# Patient Record
Sex: FEMALE | Race: WHITE | Employment: OTHER | URBAN - METROPOLITAN AREA
[De-identification: names, ages, dates, MRNs, and addresses within clinical notes are randomized per-mention and may not be internally consistent; named-entity substitution may affect disease eponyms.]

---

## 2023-10-12 ENCOUNTER — TELEPHONE (OUTPATIENT)
Dept: PRIMARY CARE | Facility: CLINIC | Age: 73
End: 2023-10-12
Payer: MEDICARE

## 2023-10-12 DIAGNOSIS — E78.5 HYPERLIPIDEMIA, UNSPECIFIED HYPERLIPIDEMIA TYPE: ICD-10-CM

## 2023-10-12 PROBLEM — M25.562 LEFT KNEE PAIN: Status: ACTIVE | Noted: 2023-10-12

## 2023-10-12 PROBLEM — I44.7 LEFT BUNDLE BRANCH BLOCK (LBBB): Status: ACTIVE | Noted: 2023-10-12

## 2023-10-12 PROBLEM — E78.00 ELEVATED LDL CHOLESTEROL LEVEL: Status: ACTIVE | Noted: 2023-10-12

## 2023-10-12 PROBLEM — H40.9 GLAUCOMA: Status: ACTIVE | Noted: 2023-10-12

## 2023-10-12 PROBLEM — J02.9 SORE THROAT: Status: ACTIVE | Noted: 2023-10-12

## 2023-10-12 PROBLEM — R79.89 ELEVATED TSH: Status: ACTIVE | Noted: 2023-10-12

## 2023-10-12 PROBLEM — R31.9 HEMATURIA: Status: ACTIVE | Noted: 2023-10-12

## 2023-10-12 PROBLEM — J20.9 ACUTE BRONCHITIS: Status: ACTIVE | Noted: 2023-10-12

## 2023-10-12 PROBLEM — H61.21 IMPACTED CERUMEN OF RIGHT EAR: Status: ACTIVE | Noted: 2023-10-12

## 2023-12-19 ENCOUNTER — LAB (OUTPATIENT)
Dept: LAB | Facility: LAB | Age: 73
End: 2023-12-19
Payer: MEDICARE

## 2023-12-19 DIAGNOSIS — E78.5 HYPERLIPIDEMIA, UNSPECIFIED HYPERLIPIDEMIA TYPE: ICD-10-CM

## 2023-12-19 LAB
ALBUMIN SERPL BCP-MCNC: 4.7 G/DL (ref 3.4–5)
ALP SERPL-CCNC: 61 U/L (ref 33–136)
ALT SERPL W P-5'-P-CCNC: 10 U/L (ref 7–45)
ANION GAP SERPL CALC-SCNC: 12 MMOL/L (ref 10–20)
AST SERPL W P-5'-P-CCNC: 17 U/L (ref 9–39)
BASOPHILS # BLD AUTO: 0.03 X10*3/UL (ref 0–0.1)
BASOPHILS NFR BLD AUTO: 0.6 %
BILIRUB SERPL-MCNC: 0.7 MG/DL (ref 0–1.2)
BUN SERPL-MCNC: 13 MG/DL (ref 6–23)
CALCIUM SERPL-MCNC: 9.7 MG/DL (ref 8.6–10.6)
CHLORIDE SERPL-SCNC: 104 MMOL/L (ref 98–107)
CHOLEST SERPL-MCNC: 211 MG/DL (ref 0–199)
CHOLESTEROL/HDL RATIO: 3.9
CO2 SERPL-SCNC: 28 MMOL/L (ref 21–32)
CREAT SERPL-MCNC: 0.76 MG/DL (ref 0.5–1.05)
EOSINOPHIL # BLD AUTO: 0.2 X10*3/UL (ref 0–0.4)
EOSINOPHIL NFR BLD AUTO: 3.9 %
ERYTHROCYTE [DISTWIDTH] IN BLOOD BY AUTOMATED COUNT: 12.1 % (ref 11.5–14.5)
GFR SERPL CREATININE-BSD FRML MDRD: 83 ML/MIN/1.73M*2
GLUCOSE SERPL-MCNC: 99 MG/DL (ref 74–99)
HCT VFR BLD AUTO: 42.4 % (ref 36–46)
HDLC SERPL-MCNC: 54.4 MG/DL
HGB BLD-MCNC: 14.3 G/DL (ref 12–16)
IMM GRANULOCYTES # BLD AUTO: 0.02 X10*3/UL (ref 0–0.5)
IMM GRANULOCYTES NFR BLD AUTO: 0.4 % (ref 0–0.9)
LDLC SERPL CALC-MCNC: 141 MG/DL
LYMPHOCYTES # BLD AUTO: 1.47 X10*3/UL (ref 0.8–3)
LYMPHOCYTES NFR BLD AUTO: 28.8 %
MCH RBC QN AUTO: 32.3 PG (ref 26–34)
MCHC RBC AUTO-ENTMCNC: 33.7 G/DL (ref 32–36)
MCV RBC AUTO: 96 FL (ref 80–100)
MONOCYTES # BLD AUTO: 0.44 X10*3/UL (ref 0.05–0.8)
MONOCYTES NFR BLD AUTO: 8.6 %
NEUTROPHILS # BLD AUTO: 2.95 X10*3/UL (ref 1.6–5.5)
NEUTROPHILS NFR BLD AUTO: 57.7 %
NON HDL CHOLESTEROL: 157 MG/DL (ref 0–149)
NRBC BLD-RTO: 0 /100 WBCS (ref 0–0)
PLATELET # BLD AUTO: 260 X10*3/UL (ref 150–450)
POTASSIUM SERPL-SCNC: 4.6 MMOL/L (ref 3.5–5.3)
PROT SERPL-MCNC: 7.4 G/DL (ref 6.4–8.2)
RBC # BLD AUTO: 4.43 X10*6/UL (ref 4–5.2)
SODIUM SERPL-SCNC: 139 MMOL/L (ref 136–145)
TRIGL SERPL-MCNC: 79 MG/DL (ref 0–149)
TSH SERPL-ACNC: 3.22 MIU/L (ref 0.44–3.98)
VLDL: 16 MG/DL (ref 0–40)
WBC # BLD AUTO: 5.1 X10*3/UL (ref 4.4–11.3)

## 2023-12-19 PROCEDURE — 80053 COMPREHEN METABOLIC PANEL: CPT

## 2023-12-19 PROCEDURE — 84443 ASSAY THYROID STIM HORMONE: CPT

## 2023-12-19 PROCEDURE — 85025 COMPLETE CBC W/AUTO DIFF WBC: CPT

## 2023-12-19 PROCEDURE — 80061 LIPID PANEL: CPT

## 2023-12-19 PROCEDURE — 36415 COLL VENOUS BLD VENIPUNCTURE: CPT

## 2023-12-22 ENCOUNTER — OFFICE VISIT (OUTPATIENT)
Dept: PRIMARY CARE | Facility: CLINIC | Age: 73
End: 2023-12-22
Payer: MEDICARE

## 2023-12-22 VITALS
OXYGEN SATURATION: 94 % | BODY MASS INDEX: 23.82 KG/M2 | DIASTOLIC BLOOD PRESSURE: 82 MMHG | SYSTOLIC BLOOD PRESSURE: 121 MMHG | RESPIRATION RATE: 16 BRPM | WEIGHT: 143 LBS | HEART RATE: 66 BPM | HEIGHT: 65 IN

## 2023-12-22 DIAGNOSIS — Z71.89 ADVANCE DIRECTIVE DISCUSSED WITH PATIENT: ICD-10-CM

## 2023-12-22 DIAGNOSIS — E78.5 HYPERLIPIDEMIA, UNSPECIFIED HYPERLIPIDEMIA TYPE: Primary | ICD-10-CM

## 2023-12-22 DIAGNOSIS — Z00.00 MEDICARE ANNUAL WELLNESS VISIT, SUBSEQUENT: ICD-10-CM

## 2023-12-22 DIAGNOSIS — Z71.89 COUNSELING ON HEALTH PROMOTION AND DISEASE PREVENTION: ICD-10-CM

## 2023-12-22 DIAGNOSIS — Z71.89 CARDIAC RISK COUNSELING: ICD-10-CM

## 2023-12-22 PROBLEM — E78.00 ELEVATED LDL CHOLESTEROL LEVEL: Status: RESOLVED | Noted: 2023-10-12 | Resolved: 2023-12-22

## 2023-12-22 PROBLEM — R31.9 HEMATURIA: Status: RESOLVED | Noted: 2023-10-12 | Resolved: 2023-12-22

## 2023-12-22 PROBLEM — J02.9 SORE THROAT: Status: RESOLVED | Noted: 2023-10-12 | Resolved: 2023-12-22

## 2023-12-22 PROBLEM — J20.9 ACUTE BRONCHITIS: Status: RESOLVED | Noted: 2023-10-12 | Resolved: 2023-12-22

## 2023-12-22 PROBLEM — R42 VERTIGO: Status: ACTIVE | Noted: 2022-04-01

## 2023-12-22 PROBLEM — M25.562 LEFT KNEE PAIN: Status: RESOLVED | Noted: 2023-10-12 | Resolved: 2023-12-22

## 2023-12-22 PROBLEM — H61.21 IMPACTED CERUMEN OF RIGHT EAR: Status: RESOLVED | Noted: 2023-10-12 | Resolved: 2023-12-22

## 2023-12-22 PROCEDURE — 1158F ADVNC CARE PLAN TLK DOCD: CPT | Performed by: NURSE PRACTITIONER

## 2023-12-22 PROCEDURE — G0439 PPPS, SUBSEQ VISIT: HCPCS | Performed by: NURSE PRACTITIONER

## 2023-12-22 PROCEDURE — 1036F TOBACCO NON-USER: CPT | Performed by: NURSE PRACTITIONER

## 2023-12-22 PROCEDURE — 1160F RVW MEDS BY RX/DR IN RCRD: CPT | Performed by: NURSE PRACTITIONER

## 2023-12-22 PROCEDURE — 1125F AMNT PAIN NOTED PAIN PRSNT: CPT | Performed by: NURSE PRACTITIONER

## 2023-12-22 PROCEDURE — 1159F MED LIST DOCD IN RCRD: CPT | Performed by: NURSE PRACTITIONER

## 2023-12-22 PROCEDURE — G0446 INTENS BEHAVE THER CARDIO DX: HCPCS | Performed by: NURSE PRACTITIONER

## 2023-12-22 PROCEDURE — 1170F FXNL STATUS ASSESSED: CPT | Performed by: NURSE PRACTITIONER

## 2023-12-22 PROCEDURE — G0444 DEPRESSION SCREEN ANNUAL: HCPCS | Performed by: NURSE PRACTITIONER

## 2023-12-22 PROCEDURE — 99497 ADVNCD CARE PLAN 30 MIN: CPT | Performed by: NURSE PRACTITIONER

## 2023-12-22 RX ORDER — TIMOLOL MALEATE 5 MG/ML
1 SOLUTION OPHTHALMIC DAILY
COMMUNITY

## 2023-12-22 ASSESSMENT — ACTIVITIES OF DAILY LIVING (ADL)
DOING_HOUSEWORK: INDEPENDENT
GROCERY_SHOPPING: INDEPENDENT
MANAGING_FINANCES: INDEPENDENT
TAKING_MEDICATION: INDEPENDENT
DRESSING: INDEPENDENT
BATHING: INDEPENDENT

## 2023-12-22 ASSESSMENT — ENCOUNTER SYMPTOMS
FREQUENCY: 0
DIARRHEA: 0
OCCASIONAL FEELINGS OF UNSTEADINESS: 0
NAUSEA: 0
VOMITING: 0
HEMATURIA: 0
FEVER: 0
WHEEZING: 0
ABDOMINAL PAIN: 0
ARTHRALGIAS: 0
ABDOMINAL DISTENTION: 0
CONSTIPATION: 0
ACTIVITY CHANGE: 0
PALPITATIONS: 0
COUGH: 0
EYE ITCHING: 0
LOSS OF SENSATION IN FEET: 0
DEPRESSION: 0
APPETITE CHANGE: 0
CHILLS: 0
NUMBNESS: 0
NERVOUS/ANXIOUS: 0
EYE PAIN: 0
SHORTNESS OF BREATH: 0
DYSURIA: 0

## 2023-12-22 ASSESSMENT — PATIENT HEALTH QUESTIONNAIRE - PHQ9
SUM OF ALL RESPONSES TO PHQ9 QUESTIONS 1 AND 2: 0
1. LITTLE INTEREST OR PLEASURE IN DOING THINGS: NOT AT ALL
2. FEELING DOWN, DEPRESSED OR HOPELESS: NOT AT ALL
10. IF YOU CHECKED OFF ANY PROBLEMS, HOW DIFFICULT HAVE THESE PROBLEMS MADE IT FOR YOU TO DO YOUR WORK, TAKE CARE OF THINGS AT HOME, OR GET ALONG WITH OTHER PEOPLE: NOT DIFFICULT AT ALL

## 2023-12-22 NOTE — PROGRESS NOTES
Subjective   Chief Complaint: Medicare Annual Wellness Visit Subsequent.    HPI   Azul Gurrola is a 73 y.o. female who presents for Medicare Annual Wellness Visit Subsequent.    Patient reports feeling overall well, She has a burn on her left hand/thumb that she got while cooking. Reports there was a blister that has not popped. Skin underneath is pink.     Patient denies fever, chills, nausea, vomiting, diarrhea, chest pain, heart palpations, or shortness of breath.     BW reviewed - LDL is elevated recommend meditterean diet     Patient declines immunizations and EKG today    Healthcare Maintenance:  - Tdap: 2017  - Shingles: declines  - PCV 20: declines  - Colonoscopy: 2019  - Mammogram: scheduled May 2024  - Dexa scan: 02/2023      Social History:  - Occupation: Retired   - Alcohol: once per months  - Caffeine: 1 cup per some days   - Nicotine: none, never  - Recreational drugs: none   - Exercise: jazzercise 5 days per week and walking   - Diet: eats most meals in     Specialist:  - Dr. Valle opthomologist   - Dentist         Review of Systems   Constitutional:  Negative for activity change, appetite change, chills and fever.   HENT:  Negative for congestion.    Eyes:  Negative for pain and itching.   Respiratory:  Negative for cough, shortness of breath and wheezing.    Cardiovascular:  Negative for chest pain, palpitations and leg swelling.   Gastrointestinal:  Negative for abdominal distention, abdominal pain, constipation, diarrhea, nausea and vomiting.   Genitourinary:  Negative for dysuria, frequency, hematuria and urgency.   Musculoskeletal:  Negative for arthralgias and gait problem.   Skin:  Negative for rash.        Left hand and thumb burn -- healing skin    Neurological:  Negative for numbness.   Psychiatric/Behavioral:  The patient is not nervous/anxious.        Objective   /82 (BP Location: Left arm, Patient Position: Sitting, BP Cuff Size: Adult)   Pulse 66   Resp 16   Ht 1.638 m (5'  "4.5\")   Wt 64.9 kg (143 lb)   LMP  (LMP Unknown)   SpO2 94%   BMI 24.17 kg/m²   BSA Body surface area is 1.72 meters squared.      Physical Exam  Constitutional:       Appearance: Normal appearance.   HENT:      Head: Normocephalic.      Right Ear: Tympanic membrane and external ear normal.      Left Ear: Tympanic membrane and external ear normal.      Nose: Nose normal.      Mouth/Throat:      Mouth: Mucous membranes are moist.      Pharynx: Oropharynx is clear.   Eyes:      Extraocular Movements: Extraocular movements intact.      Conjunctiva/sclera: Conjunctivae normal.      Pupils: Pupils are equal, round, and reactive to light.   Cardiovascular:      Rate and Rhythm: Normal rate and regular rhythm.      Pulses: Normal pulses.      Heart sounds: Normal heart sounds.   Pulmonary:      Effort: Pulmonary effort is normal.      Breath sounds: Normal breath sounds.   Abdominal:      General: Bowel sounds are normal.      Palpations: Abdomen is soft.   Musculoskeletal:         General: Normal range of motion.      Cervical back: Normal range of motion.      Right lower leg: No edema.      Left lower leg: No edema.   Skin:     Comments: Left hand burn healing    Neurological:      General: No focal deficit present.      Mental Status: She is alert and oriented to person, place, and time.   Psychiatric:         Mood and Affect: Mood is not anxious or depressed.       Lab on 12/19/2023   Component Date Value Ref Range Status    WBC 12/19/2023 5.1  4.4 - 11.3 x10*3/uL Final    nRBC 12/19/2023 0.0  0.0 - 0.0 /100 WBCs Final    RBC 12/19/2023 4.43  4.00 - 5.20 x10*6/uL Final    Hemoglobin 12/19/2023 14.3  12.0 - 16.0 g/dL Final    Hematocrit 12/19/2023 42.4  36.0 - 46.0 % Final    MCV 12/19/2023 96  80 - 100 fL Final    MCH 12/19/2023 32.3  26.0 - 34.0 pg Final    MCHC 12/19/2023 33.7  32.0 - 36.0 g/dL Final    RDW 12/19/2023 12.1  11.5 - 14.5 % Final    Platelets 12/19/2023 260  150 - 450 x10*3/uL Final    Neutrophils " % 12/19/2023 57.7  40.0 - 80.0 % Final    Immature Granulocytes %, Automated 12/19/2023 0.4  0.0 - 0.9 % Final    Immature Granulocyte Count (IG) includes promyelocytes, myelocytes and metamyelocytes but does not include bands. Percent differential counts (%) should be interpreted in the context of the absolute cell counts (cells/UL).    Lymphocytes % 12/19/2023 28.8  13.0 - 44.0 % Final    Monocytes % 12/19/2023 8.6  2.0 - 10.0 % Final    Eosinophils % 12/19/2023 3.9  0.0 - 6.0 % Final    Basophils % 12/19/2023 0.6  0.0 - 2.0 % Final    Neutrophils Absolute 12/19/2023 2.95  1.60 - 5.50 x10*3/uL Final    Percent differential counts (%) should be interpreted in the context of the absolute cell counts (cells/uL).    Immature Granulocytes Absolute, Au* 12/19/2023 0.02  0.00 - 0.50 x10*3/uL Final    Lymphocytes Absolute 12/19/2023 1.47  0.80 - 3.00 x10*3/uL Final    Monocytes Absolute 12/19/2023 0.44  0.05 - 0.80 x10*3/uL Final    Eosinophils Absolute 12/19/2023 0.20  0.00 - 0.40 x10*3/uL Final    Basophils Absolute 12/19/2023 0.03  0.00 - 0.10 x10*3/uL Final    Glucose 12/19/2023 99  74 - 99 mg/dL Final    Sodium 12/19/2023 139  136 - 145 mmol/L Final    Potassium 12/19/2023 4.6  3.5 - 5.3 mmol/L Final    Chloride 12/19/2023 104  98 - 107 mmol/L Final    Bicarbonate 12/19/2023 28  21 - 32 mmol/L Final    Anion Gap 12/19/2023 12  10 - 20 mmol/L Final    Urea Nitrogen 12/19/2023 13  6 - 23 mg/dL Final    Creatinine 12/19/2023 0.76  0.50 - 1.05 mg/dL Final    eGFR 12/19/2023 83  >60 mL/min/1.73m*2 Final    Calculations of estimated GFR are performed using the 2021 CKD-EPI Study Refit equation without the race variable for the IDMS-Traceable creatinine methods.  https://jasn.asnjournals.org/content/early/2021/09/22/ASN.7751291498    Calcium 12/19/2023 9.7  8.6 - 10.6 mg/dL Final    Albumin 12/19/2023 4.7  3.4 - 5.0 g/dL Final    Alkaline Phosphatase 12/19/2023 61  33 - 136 U/L Final    Total Protein 12/19/2023 7.4  6.4 -  8.2 g/dL Final    AST 12/19/2023 17  9 - 39 U/L Final    Bilirubin, Total 12/19/2023 0.7  0.0 - 1.2 mg/dL Final    ALT 12/19/2023 10  7 - 45 U/L Final    Patients treated with Sulfasalazine may generate falsely decreased results for ALT.    Cholesterol 12/19/2023 211 (H)  0 - 199 mg/dL Final          Age      Desirable   Borderline High   High     0-19 Y     0 - 169       170 - 199     >/= 200    20-24 Y     0 - 189       190 - 224     >/= 225         >24 Y     0 - 199       200 - 239     >/= 240   **All ranges are based on fasting samples. Specific   therapeutic targets will vary based on patient-specific   cardiac risk.    Pediatric guidelines reference:Pediatrics 2011, 128(S5).Adult guidelines reference: NCEP ATPIII Guidelines,EVELYN 2001, 258:2486-97    Venipuncture immediately after or during the administration of Metamizole may lead to falsely low results. Testing should be performed immediately prior to Metamizole dosing.    HDL-Cholesterol 12/19/2023 54.4  mg/dL Final      Age       Very Low   Low     Normal    High    0-19 Y    < 35      < 40     40-45     ----  20-24 Y    ----     < 40      >45      ----        >24 Y      ----     < 40     40-60      >60      Cholesterol/HDL Ratio 12/19/2023 3.9   Final      Ref Values  Desirable  < 3.4  High Risk  > 5.0    LDL Calculated 12/19/2023 141 (H)  <=99 mg/dL Final                                Near   Borderline      AGE      Desirable  Optimal    High     High     Very High     0-19 Y     0 - 109     ---    110-129   >/= 130     ----    20-24 Y     0 - 119     ---    120-159   >/= 160     ----      >24 Y     0 -  99   100-129  130-159   160-189     >/=190      VLDL 12/19/2023 16  0 - 40 mg/dL Final    Triglycerides 12/19/2023 79  0 - 149 mg/dL Final       Age         Desirable   Borderline High   High     Very High   0 D-90 D    19 - 174         ----         ----        ----  91 D- 9 Y     0 -  74        75 -  99     >/= 100      ----    10-19 Y     0 -  89         90 - 129     >/= 130      ----    20-24 Y     0 - 114       115 - 149     >/= 150      ----         >24 Y     0 - 149       150 - 199    200- 499    >/= 500    Venipuncture immediately after or during the administration of Metamizole may lead to falsely low results. Testing should be performed immediately prior to Metamizole dosing.    Non HDL Cholesterol 12/19/2023 157 (H)  0 - 149 mg/dL Final          Age       Desirable   Borderline High   High     Very High     0-19 Y     0 - 119       120 - 144     >/= 145    >/= 160    20-24 Y     0 - 149       150 - 189     >/= 190      ----         >24 Y    30 mg/dL above LDL Cholesterol goal      Thyroid Stimulating Hormone 12/19/2023 3.22  0.44 - 3.98 mIU/L Final     Current Outpatient Medications on File Prior to Visit   Medication Sig Dispense Refill    timolol (Timoptic-XR) 0.5 % ophthalmic gel-forming 1 drop once daily.       No current facility-administered medications on file prior to visit.     No images are attached to the encounter.            Assessment/Plan   Problem List Items Addressed This Visit             ICD-10-CM    Medicare annual wellness visit, subsequent Z00.00     Healthcare Maintenance:  - Tdap: 2017  - Shingles: declines  - PCV 20: declines  - Colonoscopy: 2019  - Mammogram: scheduled May 2024  - Dexa scan: 02/2023      Social History:  - Occupation: Retired   - Alcohol: once per months  - Caffeine: 1 cup per some days   - Nicotine: none, never  - Recreational drugs: none   - Exercise: jazzercise 5 days per week and walking   - Diet: eats most meals in     Specialist:  - Dr. Valle opthomologist   - Dentist          Hyperlipidemia - Primary E78.5     Elevated LDL'  Mediterrean diet and exercise recommended  CT cardiac score ordered          Relevant Orders    CT cardiac scoring wo IV contrast     Other Visit Diagnoses         Codes    Advance directive discussed with patient [Z71.89]     Z71.89    Cardiac risk counseling [Z71.89]     Z71.89     Counseling on health promotion and disease prevention [Z71.89]     Z71.89

## 2023-12-22 NOTE — ASSESSMENT & PLAN NOTE
Healthcare Maintenance:  - Tdap: 2017  - Shingles: declines  - PCV 20: declines  - Colonoscopy: 2019  - Mammogram: scheduled May 2024  - Dexa scan: 02/2023      Social History:  - Occupation: Retired   - Alcohol: once per months  - Caffeine: 1 cup per some days   - Nicotine: none, never  - Recreational drugs: none   - Exercise: jazzercise 5 days per week and walking   - Diet: eats most meals in     Specialist:  - Dr. Valle opthomologist   - Dentist

## 2023-12-22 NOTE — PATIENT INSTRUCTIONS
Recommend Meditterrean diet and exercise to lower cholesterol   CT cardiac score ordered   Follow up as needed

## 2023-12-22 NOTE — ACP (ADVANCE CARE PLANNING)
Confirming Previous Code Status:   Advance Care Planning Note     Discussion Date: 12/22/23   Discussion Participants: patient    The patient wishes to discuss Advance Care Planning today and the following is a brief summary of our discussion.     Patient has capacity to make their own medical decisions: Yes  Health Care Agent/Surrogate Decision Maker documented in chart:  Juma Lara    Documents on file and valid:  Advance Directive/Living Will:  patient to bring in    Health Care Power of :  patient to bring in       Treatment Decisions  Goals of Care: comfort is paramount; other goals are not relevant or achievable     Time Statement: Total face to face time spent on advance care planning was 20 minutes with 10 minutes spent in counseling, including the explanation.    MARY CARMEN Carlson-CNP  12/22/2023 9:21 AM

## 2024-02-21 ENCOUNTER — TELEPHONE (OUTPATIENT)
Dept: PRIMARY CARE | Facility: CLINIC | Age: 74
End: 2024-02-21
Payer: MEDICARE

## 2024-02-21 NOTE — TELEPHONE ENCOUNTER
Please schedule patient for [facility] wellness visit in Dec 2024. Schedule with Wendy or Dr. Kaufman. Please send this encounter to [facility] for lab orders once scheduled.     Thank you-  Obed Power CMA  2/21/2024  Practice Supervisor  [facility]

## 2024-02-23 NOTE — TELEPHONE ENCOUNTER
Pt coming in on 12/23/24 for medicare annual physical. BW? Pt will go down stairs for blood work.

## 2024-02-26 DIAGNOSIS — E78.5 HYPERLIPIDEMIA, UNSPECIFIED HYPERLIPIDEMIA TYPE: ICD-10-CM

## 2024-10-04 ENCOUNTER — OFFICE VISIT (OUTPATIENT)
Dept: PRIMARY CARE | Facility: CLINIC | Age: 74
End: 2024-10-04
Payer: MEDICARE

## 2024-10-04 VITALS
BODY MASS INDEX: 24.16 KG/M2 | HEART RATE: 72 BPM | HEIGHT: 65 IN | WEIGHT: 145 LBS | OXYGEN SATURATION: 98 % | DIASTOLIC BLOOD PRESSURE: 90 MMHG | SYSTOLIC BLOOD PRESSURE: 154 MMHG | TEMPERATURE: 96.7 F

## 2024-10-04 DIAGNOSIS — S89.92XD TRAUMATIC LEG INJURY, LEFT, SUBSEQUENT ENCOUNTER: ICD-10-CM

## 2024-10-04 DIAGNOSIS — M79.89 RIGHT LEG SWELLING: Primary | ICD-10-CM

## 2024-10-04 DIAGNOSIS — R60.0 BILATERAL LOWER EXTREMITY EDEMA: ICD-10-CM

## 2024-10-04 PROCEDURE — 1159F MED LIST DOCD IN RCRD: CPT | Performed by: FAMILY MEDICINE

## 2024-10-04 PROCEDURE — 3008F BODY MASS INDEX DOCD: CPT | Performed by: FAMILY MEDICINE

## 2024-10-04 PROCEDURE — 99214 OFFICE O/P EST MOD 30 MIN: CPT | Performed by: FAMILY MEDICINE

## 2024-10-04 RX ORDER — LATANOPROST 50 UG/ML
1 SOLUTION/ DROPS OPHTHALMIC NIGHTLY
COMMUNITY
Start: 2024-06-25

## 2024-10-04 ASSESSMENT — PATIENT HEALTH QUESTIONNAIRE - PHQ9
10. IF YOU CHECKED OFF ANY PROBLEMS, HOW DIFFICULT HAVE THESE PROBLEMS MADE IT FOR YOU TO DO YOUR WORK, TAKE CARE OF THINGS AT HOME, OR GET ALONG WITH OTHER PEOPLE: NOT DIFFICULT AT ALL
2. FEELING DOWN, DEPRESSED OR HOPELESS: NOT AT ALL
SUM OF ALL RESPONSES TO PHQ9 QUESTIONS 1 AND 2: 0
1. LITTLE INTEREST OR PLEASURE IN DOING THINGS: NOT AT ALL

## 2024-10-04 ASSESSMENT — ENCOUNTER SYMPTOMS
LOSS OF SENSATION IN FEET: 0
DEPRESSION: 0
OCCASIONAL FEELINGS OF UNSTEADINESS: 0

## 2024-10-04 NOTE — PATIENT INSTRUCTIONS
Ultrasound of the legs.  Ultrasound of the legs being performed without DVT.    ER reports were reviewed.    Patient sent for stat ultrasound rule out DVT      X-rays reviewed.
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
## 2024-10-06 ASSESSMENT — ENCOUNTER SYMPTOMS
RESPIRATORY NEGATIVE: 1
CONSTITUTIONAL NEGATIVE: 1
DIAPHORESIS: 0
ARTHRALGIAS: 1
CARDIOVASCULAR NEGATIVE: 1
DIARRHEA: 0
ABDOMINAL PAIN: 0
NECK PAIN: 0
ACTIVITY CHANGE: 0
GASTROINTESTINAL NEGATIVE: 1
COLOR CHANGE: 1
TREMORS: 0
MYALGIAS: 1
WOUND: 1

## 2024-12-23 ENCOUNTER — APPOINTMENT (OUTPATIENT)
Dept: PRIMARY CARE | Facility: CLINIC | Age: 74
End: 2024-12-23
Payer: MEDICARE

## 2024-12-23 VITALS
SYSTOLIC BLOOD PRESSURE: 122 MMHG | BODY MASS INDEX: 23.39 KG/M2 | WEIGHT: 137 LBS | HEART RATE: 61 BPM | OXYGEN SATURATION: 97 % | HEIGHT: 64 IN | TEMPERATURE: 97.9 F | DIASTOLIC BLOOD PRESSURE: 77 MMHG

## 2024-12-23 DIAGNOSIS — Z00.00 ROUTINE GENERAL MEDICAL EXAMINATION AT HEALTH CARE FACILITY: ICD-10-CM

## 2024-12-23 DIAGNOSIS — Z00.00 ENCOUNTER FOR ANNUAL GENERAL MEDICAL EXAMINATION WITHOUT ABNORMAL FINDINGS IN ADULT: ICD-10-CM

## 2024-12-23 DIAGNOSIS — Z71.89 CARDIAC RISK COUNSELING: ICD-10-CM

## 2024-12-23 DIAGNOSIS — Z13.820 ENCOUNTER FOR OSTEOPOROSIS SCREENING IN ASYMPTOMATIC POSTMENOPAUSAL PATIENT: ICD-10-CM

## 2024-12-23 DIAGNOSIS — Z00.00 ENCOUNTER FOR ANNUAL WELLNESS VISIT (AWV) IN MEDICARE PATIENT: Primary | ICD-10-CM

## 2024-12-23 DIAGNOSIS — H40.9 GLAUCOMA, UNSPECIFIED GLAUCOMA TYPE, UNSPECIFIED LATERALITY: ICD-10-CM

## 2024-12-23 DIAGNOSIS — Z00.00 MEDICARE ANNUAL WELLNESS VISIT, SUBSEQUENT: ICD-10-CM

## 2024-12-23 DIAGNOSIS — Z78.0 ENCOUNTER FOR OSTEOPOROSIS SCREENING IN ASYMPTOMATIC POSTMENOPAUSAL PATIENT: ICD-10-CM

## 2024-12-23 DIAGNOSIS — E78.5 HYPERLIPIDEMIA, UNSPECIFIED HYPERLIPIDEMIA TYPE: ICD-10-CM

## 2024-12-23 DIAGNOSIS — S89.92XD TRAUMATIC LEG INJURY, LEFT, SUBSEQUENT ENCOUNTER: ICD-10-CM

## 2024-12-23 DIAGNOSIS — M79.89 RIGHT LEG SWELLING: ICD-10-CM

## 2024-12-23 DIAGNOSIS — Z71.89 ADVANCE DIRECTIVE DISCUSSED WITH PATIENT: ICD-10-CM

## 2024-12-23 DIAGNOSIS — I44.7 LEFT BUNDLE BRANCH BLOCK (LBBB): ICD-10-CM

## 2024-12-23 PROBLEM — R42 VERTIGO: Status: RESOLVED | Noted: 2022-04-01 | Resolved: 2024-12-23

## 2024-12-23 PROBLEM — R60.0 BILATERAL LOWER EXTREMITY EDEMA: Status: RESOLVED | Noted: 2024-10-04 | Resolved: 2024-12-23

## 2024-12-23 PROBLEM — R79.89 ELEVATED TSH: Status: RESOLVED | Noted: 2023-10-12 | Resolved: 2024-12-23

## 2024-12-23 PROCEDURE — 1158F ADVNC CARE PLAN TLK DOCD: CPT | Performed by: FAMILY MEDICINE

## 2024-12-23 PROCEDURE — 99497 ADVNCD CARE PLAN 30 MIN: CPT | Performed by: FAMILY MEDICINE

## 2024-12-23 PROCEDURE — 1170F FXNL STATUS ASSESSED: CPT | Performed by: FAMILY MEDICINE

## 2024-12-23 PROCEDURE — 1036F TOBACCO NON-USER: CPT | Performed by: FAMILY MEDICINE

## 2024-12-23 PROCEDURE — G0439 PPPS, SUBSEQ VISIT: HCPCS | Performed by: FAMILY MEDICINE

## 2024-12-23 PROCEDURE — 99214 OFFICE O/P EST MOD 30 MIN: CPT | Performed by: FAMILY MEDICINE

## 2024-12-23 PROCEDURE — 3008F BODY MASS INDEX DOCD: CPT | Performed by: FAMILY MEDICINE

## 2024-12-23 PROCEDURE — G0446 INTENS BEHAVE THER CARDIO DX: HCPCS | Performed by: FAMILY MEDICINE

## 2024-12-23 PROCEDURE — 1159F MED LIST DOCD IN RCRD: CPT | Performed by: FAMILY MEDICINE

## 2024-12-23 PROCEDURE — 1123F ACP DISCUSS/DSCN MKR DOCD: CPT | Performed by: FAMILY MEDICINE

## 2024-12-23 PROCEDURE — 1160F RVW MEDS BY RX/DR IN RCRD: CPT | Performed by: FAMILY MEDICINE

## 2024-12-23 PROCEDURE — 99397 PER PM REEVAL EST PAT 65+ YR: CPT | Performed by: FAMILY MEDICINE

## 2024-12-23 ASSESSMENT — PATIENT HEALTH QUESTIONNAIRE - PHQ9
SUM OF ALL RESPONSES TO PHQ9 QUESTIONS 1 AND 2: 0
1. LITTLE INTEREST OR PLEASURE IN DOING THINGS: NOT AT ALL
2. FEELING DOWN, DEPRESSED OR HOPELESS: NOT AT ALL

## 2024-12-23 ASSESSMENT — ENCOUNTER SYMPTOMS
VOMITING: 0
OCCASIONAL FEELINGS OF UNSTEADINESS: 0
NEUROLOGICAL NEGATIVE: 1
ABDOMINAL PAIN: 0
SINUS PRESSURE: 0
DIARRHEA: 0
CONSTIPATION: 0
PSYCHIATRIC NEGATIVE: 1
ARTHRALGIAS: 0
PALPITATIONS: 0
LOSS OF SENSATION IN FEET: 0
FACIAL ASYMMETRY: 0
SLEEP DISTURBANCE: 0
TROUBLE SWALLOWING: 0
DIFFICULTY URINATING: 0
NUMBNESS: 0
SORE THROAT: 0
NERVOUS/ANXIOUS: 0
FREQUENCY: 0
SEIZURES: 0
BLOOD IN STOOL: 0
NECK PAIN: 0
HEADACHES: 0
EYE REDNESS: 0
APPETITE CHANGE: 0
EYE PAIN: 0
MYALGIAS: 0
DEPRESSION: 0
CHILLS: 0
ACTIVITY CHANGE: 0
RHINORRHEA: 0
CONFUSION: 0
POLYDIPSIA: 0
EYE DISCHARGE: 0
COLOR CHANGE: 0
PHOTOPHOBIA: 0
BACK PAIN: 0
JOINT SWELLING: 0
WEAKNESS: 0
BRUISES/BLEEDS EASILY: 0
RESPIRATORY NEGATIVE: 1
TREMORS: 0
AGITATION: 0
NAUSEA: 0
FEVER: 0
SINUS PAIN: 0
DIAPHORESIS: 0
DIZZINESS: 0
APNEA: 0
CHEST TIGHTNESS: 0
FATIGUE: 0
COUGH: 0
SHORTNESS OF BREATH: 0
ABDOMINAL DISTENTION: 0
ADENOPATHY: 0
EYE ITCHING: 0

## 2024-12-23 ASSESSMENT — ACTIVITIES OF DAILY LIVING (ADL)
TAKING_MEDICATION: INDEPENDENT
GROCERY_SHOPPING: INDEPENDENT
MANAGING_FINANCES: INDEPENDENT
DOING_HOUSEWORK: INDEPENDENT
DRESSING: INDEPENDENT
BATHING: INDEPENDENT

## 2024-12-23 NOTE — ASSESSMENT & PLAN NOTE
Much improved.    Leg shows significant improvement with the hematoma over the anterior aspect of the left lower extremity

## 2024-12-23 NOTE — PATIENT INSTRUCTIONS
Overall leg has been improving.  Decrease swelling noted decreased tenderness.  No warmth good range of motion noted    Reviewed labs with patient.  Ordering bone density study.  Going to continue to follow-up on the left lower leg pain would like to follow-up in 3 months.      Discussion about vaccination I do recommend doing shingles vaccination but he refused if he should change her mind please let us know.    Medications reviewed and reconciled    Labs have been ordered.    Would like to have follow-up to recheck leg in the next 3 months.

## 2024-12-23 NOTE — PROGRESS NOTES
Advance Care Planning Note     Discussion Date: 12/23/24   Discussion Participants: patient    The patient wishes to discuss Advance Care Planning today and the following is a brief summary of our discussion.     Patient has capacity to make their own medical decisions: Yes  Health Care Agent/Surrogate Decision Maker documented in chart: Yes    Documents on file and valid:  Advance Directive/Living Will: No   Health Care Power of : No  Other: discussed and code status updated  Full code  Communication of Medical Status/Prognosis:   good    Communication of Treatment Goals/Options:   good    Treatment Decisions  Goals of Care: survival is prioritized, if goals for quality or survival can reasonably be achieved   agree  Follow Up Plan  Discuss next year  Team Members  PCP  Time Statement: Total face to face time spent on advance care planning was 16 minutes with 16 minutes spent in counseling, including the explanation.    Danna Rodriguez 10-year ASCVD risk score (Terrance DUMONT, et al., 2019) is: 18.3%    Values used to calculate the score:      Age: 74 years      Sex: Female      Is Non- : No      Diabetic: No      Tobacco smoker: No      Systolic Blood Pressure: 154 mmHg      Is BP treated: No      HDL Cholesterol: 54.4 mg/dL      Total Cholesterol: 211 mg/dL  Time spent was 10 mins reviewingSubjective   Patient ID: Azul Gurrola is a 74 y.o. female who presents for No chief complaint on file..    Patient having persistent left lower leg pain.  She is starting get back to more activities but still some pain some discomfort overall improving.  If she presses on the area it is painful    She has had no troubles with headache or double vision or blurry vision.  No troubles with chest pain or shortness of breath no dizziness no lightheadedness.  She is up-to-date on mammography.         Alcohol intake: none  Caffeine intake: occ  Exercise: walking 3 miles 5 days a week    Last  Colonoscopy: 2019  Last Pap smear: refused  Mammogram:5/2024  Last Dexa scan:ordered    Shingles vaccine: refused  TdaP vaccine:     Review of Systems   Constitutional:  Negative for activity change, appetite change, chills, diaphoresis, fatigue and fever.   HENT:  Negative for congestion, dental problem, drooling, nosebleeds, rhinorrhea, sinus pressure, sinus pain, sneezing, sore throat, tinnitus and trouble swallowing.    Eyes:  Negative for photophobia, pain, discharge, redness, itching and visual disturbance.        Eye exam once a year   Respiratory: Negative.  Negative for apnea, cough, chest tightness and shortness of breath.    Cardiovascular:  Negative for chest pain, palpitations and leg swelling.   Gastrointestinal:  Negative for abdominal distention, abdominal pain, blood in stool, constipation, diarrhea, nausea and vomiting.   Endocrine: Negative for cold intolerance, heat intolerance, polydipsia and polyuria.   Genitourinary:  Negative for difficulty urinating, enuresis, frequency and vaginal discharge.   Musculoskeletal:  Negative for arthralgias, back pain, joint swelling, myalgias and neck pain.        Some persistent pain noted anterior aspect of the left lower leg.  Able to do all activities and movements   Skin:  Negative for color change and rash.   Allergic/Immunologic: Negative for environmental allergies and food allergies.   Neurological: Negative.  Negative for dizziness, tremors, seizures, syncope, facial asymmetry, weakness, numbness and headaches.   Hematological:  Negative for adenopathy. Does not bruise/bleed easily.   Psychiatric/Behavioral: Negative.  Negative for agitation, behavioral problems, confusion, sleep disturbance and suicidal ideas. The patient is not nervous/anxious.        Objective   LMP  (LMP Unknown)   BSA There is no height or weight on file to calculate BSA.      Physical Exam  Constitutional:       General: She is not in acute distress.     Appearance: Normal  appearance. She is not ill-appearing or diaphoretic.   HENT:      Head: Normocephalic.      Right Ear: Tympanic membrane, ear canal and external ear normal. There is no impacted cerumen.      Left Ear: Tympanic membrane, ear canal and external ear normal. There is no impacted cerumen.      Nose: No congestion or rhinorrhea.      Mouth/Throat:      Pharynx: No oropharyngeal exudate or posterior oropharyngeal erythema.   Eyes:      Conjunctiva/sclera: Conjunctivae normal.      Pupils: Pupils are equal, round, and reactive to light.   Neck:      Vascular: No carotid bruit.   Cardiovascular:      Rate and Rhythm: Normal rate.      Pulses: Normal pulses.      Heart sounds: No murmur heard.     No friction rub.   Pulmonary:      Effort: No respiratory distress.      Breath sounds: No stridor. No wheezing.   Abdominal:      General: Abdomen is flat. There is no distension.      Tenderness: There is no abdominal tenderness. There is no guarding.   Genitourinary:     Rectum: Guaiac result negative.   Musculoskeletal:         General: No swelling or tenderness.      Cervical back: Normal range of motion and neck supple.      Right lower leg: No edema.      Left lower leg: No edema.   Lymphadenopathy:      Cervical: No cervical adenopathy.   Skin:     Coloration: Skin is not pale.      Findings: No rash.   Neurological:      General: No focal deficit present.      Mental Status: She is alert and oriented to person, place, and time.      Cranial Nerves: No cranial nerve deficit.      Sensory: No sensory deficit.      Motor: No weakness.      Coordination: Coordination normal.   Psychiatric:         Mood and Affect: Mood normal.         Behavior: Behavior normal.         Judgment: Judgment normal.     Examination of the left lower extremity revealed some tenderness noted over the anterior aspect.  Significant reduction in hematoma size noted.  Breast revealed no lumps tenderness masses or nipple discharge  Knee revealed good  range of motion hip reveals good range of motion flexion-extension of the foot intact.  All range of motion intact  No visits with results within 1 Year(s) from this visit.   Latest known visit with results is:   Lab on 12/19/2023   Component Date Value Ref Range Status    WBC 12/19/2023 5.1  4.4 - 11.3 x10*3/uL Final    nRBC 12/19/2023 0.0  0.0 - 0.0 /100 WBCs Final    RBC 12/19/2023 4.43  4.00 - 5.20 x10*6/uL Final    Hemoglobin 12/19/2023 14.3  12.0 - 16.0 g/dL Final    Hematocrit 12/19/2023 42.4  36.0 - 46.0 % Final    MCV 12/19/2023 96  80 - 100 fL Final    MCH 12/19/2023 32.3  26.0 - 34.0 pg Final    MCHC 12/19/2023 33.7  32.0 - 36.0 g/dL Final    RDW 12/19/2023 12.1  11.5 - 14.5 % Final    Platelets 12/19/2023 260  150 - 450 x10*3/uL Final    Neutrophils % 12/19/2023 57.7  40.0 - 80.0 % Final    Immature Granulocytes %, Automated 12/19/2023 0.4  0.0 - 0.9 % Final    Immature Granulocyte Count (IG) includes promyelocytes, myelocytes and metamyelocytes but does not include bands. Percent differential counts (%) should be interpreted in the context of the absolute cell counts (cells/UL).    Lymphocytes % 12/19/2023 28.8  13.0 - 44.0 % Final    Monocytes % 12/19/2023 8.6  2.0 - 10.0 % Final    Eosinophils % 12/19/2023 3.9  0.0 - 6.0 % Final    Basophils % 12/19/2023 0.6  0.0 - 2.0 % Final    Neutrophils Absolute 12/19/2023 2.95  1.60 - 5.50 x10*3/uL Final    Percent differential counts (%) should be interpreted in the context of the absolute cell counts (cells/uL).    Immature Granulocytes Absolute, Au* 12/19/2023 0.02  0.00 - 0.50 x10*3/uL Final    Lymphocytes Absolute 12/19/2023 1.47  0.80 - 3.00 x10*3/uL Final    Monocytes Absolute 12/19/2023 0.44  0.05 - 0.80 x10*3/uL Final    Eosinophils Absolute 12/19/2023 0.20  0.00 - 0.40 x10*3/uL Final    Basophils Absolute 12/19/2023 0.03  0.00 - 0.10 x10*3/uL Final    Glucose 12/19/2023 99  74 - 99 mg/dL Final    Sodium 12/19/2023 139  136 - 145 mmol/L Final     Potassium 12/19/2023 4.6  3.5 - 5.3 mmol/L Final    Chloride 12/19/2023 104  98 - 107 mmol/L Final    Bicarbonate 12/19/2023 28  21 - 32 mmol/L Final    Anion Gap 12/19/2023 12  10 - 20 mmol/L Final    Urea Nitrogen 12/19/2023 13  6 - 23 mg/dL Final    Creatinine 12/19/2023 0.76  0.50 - 1.05 mg/dL Final    eGFR 12/19/2023 83  >60 mL/min/1.73m*2 Final    Calculations of estimated GFR are performed using the 2021 CKD-EPI Study Refit equation without the race variable for the IDMS-Traceable creatinine methods.  https://jasn.asnjournals.org/content/early/2021/09/22/ASN.0854056503    Calcium 12/19/2023 9.7  8.6 - 10.6 mg/dL Final    Albumin 12/19/2023 4.7  3.4 - 5.0 g/dL Final    Alkaline Phosphatase 12/19/2023 61  33 - 136 U/L Final    Total Protein 12/19/2023 7.4  6.4 - 8.2 g/dL Final    AST 12/19/2023 17  9 - 39 U/L Final    Bilirubin, Total 12/19/2023 0.7  0.0 - 1.2 mg/dL Final    ALT 12/19/2023 10  7 - 45 U/L Final    Patients treated with Sulfasalazine may generate falsely decreased results for ALT.    Cholesterol 12/19/2023 211 (H)  0 - 199 mg/dL Final          Age      Desirable   Borderline High   High     0-19 Y     0 - 169       170 - 199     >/= 200    20-24 Y     0 - 189       190 - 224     >/= 225         >24 Y     0 - 199       200 - 239     >/= 240   **All ranges are based on fasting samples. Specific   therapeutic targets will vary based on patient-specific   cardiac risk.    Pediatric guidelines reference:Pediatrics 2011, 128(S5).Adult guidelines reference: NCEP ATPIII Guidelines,EVELYN 2001, 258:2486-97    Venipuncture immediately after or during the administration of Metamizole may lead to falsely low results. Testing should be performed immediately prior to Metamizole dosing.    HDL-Cholesterol 12/19/2023 54.4  mg/dL Final      Age       Very Low   Low     Normal    High    0-19 Y    < 35      < 40     40-45     ----  20-24 Y    ----     < 40      >45      ----        >24 Y      ----     < 40     40-60       >60      Cholesterol/HDL Ratio 12/19/2023 3.9   Final      Ref Values  Desirable  < 3.4  High Risk  > 5.0    LDL Calculated 12/19/2023 141 (H)  <=99 mg/dL Final                                Near   Borderline      AGE      Desirable  Optimal    High     High     Very High     0-19 Y     0 - 109     ---    110-129   >/= 130     ----    20-24 Y     0 - 119     ---    120-159   >/= 160     ----      >24 Y     0 -  99   100-129  130-159   160-189     >/=190      VLDL 12/19/2023 16  0 - 40 mg/dL Final    Triglycerides 12/19/2023 79  0 - 149 mg/dL Final       Age         Desirable   Borderline High   High     Very High   0 D-90 D    19 - 174         ----         ----        ----  91 D- 9 Y     0 -  74        75 -  99     >/= 100      ----    10-19 Y     0 -  89        90 - 129     >/= 130      ----    20-24 Y     0 - 114       115 - 149     >/= 150      ----         >24 Y     0 - 149       150 - 199    200- 499    >/= 500    Venipuncture immediately after or during the administration of Metamizole may lead to falsely low results. Testing should be performed immediately prior to Metamizole dosing.    Non HDL Cholesterol 12/19/2023 157 (H)  0 - 149 mg/dL Final          Age       Desirable   Borderline High   High     Very High     0-19 Y     0 - 119       120 - 144     >/= 145    >/= 160    20-24 Y     0 - 149       150 - 189     >/= 190      ----         >24 Y    30 mg/dL above LDL Cholesterol goal      Thyroid Stimulating Hormone 12/19/2023 3.22  0.44 - 3.98 mIU/L Final     Current Outpatient Medications on File Prior to Visit   Medication Sig Dispense Refill    latanoprost (Xalatan) 0.005 % ophthalmic solution Administer 1 drop into both eyes once daily at bedtime.      timolol (Timoptic-XR) 0.5 % ophthalmic gel-forming 1 drop once daily.       No current facility-administered medications on file prior to visit.     No images are attached to the encounter.            Assessment/Plan   Problem List Items Addressed  This Visit             ICD-10-CM    Glaucoma H40.9     Following up regularly with ophthalmology         Hyperlipidemia E78.5     Checking lipids         Left bundle branch block (LBBB) I44.7    Encounter for annual wellness visit (AWV) in Medicare patient - Primary Z00.00    Right leg swelling M79.89     Much improved almost completely resolved         Traumatic leg injury, left, subsequent encounter S89.92XD     Much improved.    Leg shows significant improvement with the hematoma over the anterior aspect of the left lower extremity          Other Visit Diagnoses         Codes    Routine general medical examination at health care facility     Z00.00    Relevant Orders    1 Year Follow Up In [facility] - PCP - Wellness Exam

## 2025-05-15 ENCOUNTER — APPOINTMENT (OUTPATIENT)
Dept: PRIMARY CARE | Facility: CLINIC | Age: 75
End: 2025-05-15
Payer: MEDICARE

## 2025-05-15 ENCOUNTER — HOSPITAL ENCOUNTER (OUTPATIENT)
Dept: RADIOLOGY | Facility: CLINIC | Age: 75
Discharge: HOME | End: 2025-05-15
Payer: MEDICARE

## 2025-05-15 VITALS
HEART RATE: 66 BPM | WEIGHT: 140 LBS | OXYGEN SATURATION: 98 % | HEIGHT: 64 IN | DIASTOLIC BLOOD PRESSURE: 66 MMHG | BODY MASS INDEX: 23.9 KG/M2 | TEMPERATURE: 97.2 F | SYSTOLIC BLOOD PRESSURE: 118 MMHG

## 2025-05-15 DIAGNOSIS — S89.92XD TRAUMATIC LEG INJURY, LEFT, SUBSEQUENT ENCOUNTER: ICD-10-CM

## 2025-05-15 DIAGNOSIS — S89.92XD TRAUMATIC LEG INJURY, LEFT, SUBSEQUENT ENCOUNTER: Primary | ICD-10-CM

## 2025-05-15 DIAGNOSIS — J02.8 ACUTE PHARYNGITIS DUE TO OTHER SPECIFIED ORGANISMS: ICD-10-CM

## 2025-05-15 PROCEDURE — 3008F BODY MASS INDEX DOCD: CPT | Performed by: FAMILY MEDICINE

## 2025-05-15 PROCEDURE — 1159F MED LIST DOCD IN RCRD: CPT | Performed by: FAMILY MEDICINE

## 2025-05-15 PROCEDURE — 99213 OFFICE O/P EST LOW 20 MIN: CPT | Performed by: FAMILY MEDICINE

## 2025-05-15 PROCEDURE — 1123F ACP DISCUSS/DSCN MKR DOCD: CPT | Performed by: FAMILY MEDICINE

## 2025-05-15 PROCEDURE — 73590 X-RAY EXAM OF LOWER LEG: CPT | Mod: LT

## 2025-05-15 RX ORDER — AMOXICILLIN 875 MG/1
875 TABLET, FILM COATED ORAL 2 TIMES DAILY
Qty: 20 TABLET | Refills: 0 | Status: SHIPPED | OUTPATIENT
Start: 2025-05-15 | End: 2025-05-25

## 2025-05-15 ASSESSMENT — PATIENT HEALTH QUESTIONNAIRE - PHQ9
SUM OF ALL RESPONSES TO PHQ9 QUESTIONS 1 AND 2: 0
10. IF YOU CHECKED OFF ANY PROBLEMS, HOW DIFFICULT HAVE THESE PROBLEMS MADE IT FOR YOU TO DO YOUR WORK, TAKE CARE OF THINGS AT HOME, OR GET ALONG WITH OTHER PEOPLE: NOT DIFFICULT AT ALL
1. LITTLE INTEREST OR PLEASURE IN DOING THINGS: NOT AT ALL
2. FEELING DOWN, DEPRESSED OR HOPELESS: NOT AT ALL

## 2025-05-15 ASSESSMENT — ENCOUNTER SYMPTOMS
POLYPHAGIA: 0
OCCASIONAL FEELINGS OF UNSTEADINESS: 0
COLOR CHANGE: 1
RHINORRHEA: 1
EYE ITCHING: 0
FLANK PAIN: 0
DEPRESSION: 0
CHEST TIGHTNESS: 0
FATIGUE: 0
APPETITE CHANGE: 0
SINUS PRESSURE: 1
SHORTNESS OF BREATH: 0
APNEA: 0
DIAPHORESIS: 0
LOSS OF SENSATION IN FEET: 0
EYE REDNESS: 0

## 2025-05-15 NOTE — PATIENT INSTRUCTIONS
Chronic continue Amoxil therapy for upper respiratory infection.  If this is not improve or any worsening symptoms please let me know    Evaluation of the left lower extremity reveals stable tenderness over the anterior aspect of the tibia I imagine this is just from the hematoma and trauma itself clinically x-ray of this area and recheck this.    As I have discussed with you today we could do MRI if this pain or symptoms not improved

## 2025-05-15 NOTE — PROGRESS NOTES
"Subjective   Patient ID: Azul Gurrola is a 75 y.o. female who presents for Follow-up.    Doing well.  Some sore throat.  Allergies OK.  Patient took some amoxicillin doing better but still having some drainage some congestion she only had a couple days worth.  Pressure has been improved with sinuses as well.    Patient also is here to follow-up on her trauma to the left lower leg.  Still getting some tenderness it seems to be superficial she can stand on tiptoes heels move the leg and knee well    Head is hematoma of that area.    Slight tenderness with deep palpation over the anterior aspect of the left tibia     patient presents for follow-up.  Patient with history of hyperlipidemia glaucoma left bundle branch block.        Review of Systems   Constitutional:  Negative for appetite change, diaphoresis and fatigue.   HENT:  Positive for congestion, rhinorrhea and sinus pressure. Negative for ear discharge and mouth sores.    Eyes:  Negative for redness and itching.   Respiratory:  Negative for apnea, chest tightness and shortness of breath.    Endocrine: Negative for heat intolerance and polyphagia.   Genitourinary:  Negative for dyspareunia, flank pain and vaginal bleeding.   Musculoskeletal:         L leg Sore   Skin:  Positive for color change.       Objective   /66   Pulse 66   Temp 36.2 °C (97.2 °F)   Ht 1.626 m (5' 4\")   Wt 63.5 kg (140 lb)   LMP  (LMP Unknown)   SpO2 98%   BMI 24.03 kg/m²   BSA Body surface area is 1.69 meters squared.      Physical Exam  HENT:      Head:      Comments: Some pressure in the frontal and maxillary sinuses.     Right Ear: Tympanic membrane normal.      Left Ear: Tympanic membrane normal.      Mouth/Throat:      Mouth: Mucous membranes are dry.   Eyes:      Extraocular Movements: Extraocular movements intact.      Pupils: Pupils are equal, round, and reactive to light.   Cardiovascular:      Rate and Rhythm: Normal rate.   Pulmonary:      Effort: Pulmonary effort " is normal. No respiratory distress.      Breath sounds: Normal breath sounds.   Abdominal:      General: Abdomen is flat.   Musculoskeletal:      Comments: Tenderness noted over the anterior aspect proximal aspect of the left tibia.    Review revealed no tenderness into posterior drawer sign of the knee intact flexion-extension knee intact.  Ankle reveals good range of motion some varicosities noted of the lower extremity.   Neurological:      Mental Status: She is alert.       No visits with results within 1 Year(s) from this visit.   Latest known visit with results is:   Lab on 12/19/2023   Component Date Value Ref Range Status    WBC 12/19/2023 5.1  4.4 - 11.3 x10*3/uL Final    nRBC 12/19/2023 0.0  0.0 - 0.0 /100 WBCs Final    RBC 12/19/2023 4.43  4.00 - 5.20 x10*6/uL Final    Hemoglobin 12/19/2023 14.3  12.0 - 16.0 g/dL Final    Hematocrit 12/19/2023 42.4  36.0 - 46.0 % Final    MCV 12/19/2023 96  80 - 100 fL Final    MCH 12/19/2023 32.3  26.0 - 34.0 pg Final    MCHC 12/19/2023 33.7  32.0 - 36.0 g/dL Final    RDW 12/19/2023 12.1  11.5 - 14.5 % Final    Platelets 12/19/2023 260  150 - 450 x10*3/uL Final    Neutrophils % 12/19/2023 57.7  40.0 - 80.0 % Final    Immature Granulocytes %, Automated 12/19/2023 0.4  0.0 - 0.9 % Final    Immature Granulocyte Count (IG) includes promyelocytes, myelocytes and metamyelocytes but does not include bands. Percent differential counts (%) should be interpreted in the context of the absolute cell counts (cells/UL).    Lymphocytes % 12/19/2023 28.8  13.0 - 44.0 % Final    Monocytes % 12/19/2023 8.6  2.0 - 10.0 % Final    Eosinophils % 12/19/2023 3.9  0.0 - 6.0 % Final    Basophils % 12/19/2023 0.6  0.0 - 2.0 % Final    Neutrophils Absolute 12/19/2023 2.95  1.60 - 5.50 x10*3/uL Final    Percent differential counts (%) should be interpreted in the context of the absolute cell counts (cells/uL).    Immature Granulocytes Absolute, Au* 12/19/2023 0.02  0.00 - 0.50 x10*3/uL Final     Lymphocytes Absolute 12/19/2023 1.47  0.80 - 3.00 x10*3/uL Final    Monocytes Absolute 12/19/2023 0.44  0.05 - 0.80 x10*3/uL Final    Eosinophils Absolute 12/19/2023 0.20  0.00 - 0.40 x10*3/uL Final    Basophils Absolute 12/19/2023 0.03  0.00 - 0.10 x10*3/uL Final    Glucose 12/19/2023 99  74 - 99 mg/dL Final    Sodium 12/19/2023 139  136 - 145 mmol/L Final    Potassium 12/19/2023 4.6  3.5 - 5.3 mmol/L Final    Chloride 12/19/2023 104  98 - 107 mmol/L Final    Bicarbonate 12/19/2023 28  21 - 32 mmol/L Final    Anion Gap 12/19/2023 12  10 - 20 mmol/L Final    Urea Nitrogen 12/19/2023 13  6 - 23 mg/dL Final    Creatinine 12/19/2023 0.76  0.50 - 1.05 mg/dL Final    eGFR 12/19/2023 83  >60 mL/min/1.73m*2 Final    Calculations of estimated GFR are performed using the 2021 CKD-EPI Study Refit equation without the race variable for the IDMS-Traceable creatinine methods.  https://jasn.asnjournals.org/content/early/2021/09/22/ASN.7930582979    Calcium 12/19/2023 9.7  8.6 - 10.6 mg/dL Final    Albumin 12/19/2023 4.7  3.4 - 5.0 g/dL Final    Alkaline Phosphatase 12/19/2023 61  33 - 136 U/L Final    Total Protein 12/19/2023 7.4  6.4 - 8.2 g/dL Final    AST 12/19/2023 17  9 - 39 U/L Final    Bilirubin, Total 12/19/2023 0.7  0.0 - 1.2 mg/dL Final    ALT 12/19/2023 10  7 - 45 U/L Final    Patients treated with Sulfasalazine may generate falsely decreased results for ALT.    Cholesterol 12/19/2023 211 (H)  0 - 199 mg/dL Final          Age      Desirable   Borderline High   High     0-19 Y     0 - 169       170 - 199     >/= 200    20-24 Y     0 - 189       190 - 224     >/= 225         >24 Y     0 - 199       200 - 239     >/= 240   **All ranges are based on fasting samples. Specific   therapeutic targets will vary based on patient-specific   cardiac risk.    Pediatric guidelines reference:Pediatrics 2011, 128(S5).Adult guidelines reference: NCEP ATPIII Guidelines,EVELYN 2001, 258:2486-97    Venipuncture immediately after or during  the administration of Metamizole may lead to falsely low results. Testing should be performed immediately prior to Metamizole dosing.    HDL-Cholesterol 12/19/2023 54.4  mg/dL Final      Age       Very Low   Low     Normal    High    0-19 Y    < 35      < 40     40-45     ----  20-24 Y    ----     < 40      >45      ----        >24 Y      ----     < 40     40-60      >60      Cholesterol/HDL Ratio 12/19/2023 3.9   Final      Ref Values  Desirable  < 3.4  High Risk  > 5.0    LDL Calculated 12/19/2023 141 (H)  <=99 mg/dL Final                                Near   Borderline      AGE      Desirable  Optimal    High     High     Very High     0-19 Y     0 - 109     ---    110-129   >/= 130     ----    20-24 Y     0 - 119     ---    120-159   >/= 160     ----      >24 Y     0 -  99   100-129  130-159   160-189     >/=190      VLDL 12/19/2023 16  0 - 40 mg/dL Final    Triglycerides 12/19/2023 79  0 - 149 mg/dL Final       Age         Desirable   Borderline High   High     Very High   0 D-90 D    19 - 174         ----         ----        ----  91 D- 9 Y     0 -  74        75 -  99     >/= 100      ----    10-19 Y     0 -  89        90 - 129     >/= 130      ----    20-24 Y     0 - 114       115 - 149     >/= 150      ----         >24 Y     0 - 149       150 - 199    200- 499    >/= 500    Venipuncture immediately after or during the administration of Metamizole may lead to falsely low results. Testing should be performed immediately prior to Metamizole dosing.    Non HDL Cholesterol 12/19/2023 157 (H)  0 - 149 mg/dL Final          Age       Desirable   Borderline High   High     Very High     0-19 Y     0 - 119       120 - 144     >/= 145    >/= 160    20-24 Y     0 - 149       150 - 189     >/= 190      ----         >24 Y    30 mg/dL above LDL Cholesterol goal      Thyroid Stimulating Hormone 12/19/2023 3.22  0.44 - 3.98 mIU/L Final     Medications Ordered Prior to Encounter[1]  No images are attached to the encounter.             Assessment/Plan   Problem List Items Addressed This Visit           ICD-10-CM    Traumatic leg injury, left, subsequent encounter - Primary S89.92XD    Going to check x-ray of the left leg going to do x-ray of the left tib-fib.         Acute pharyngitis due to other specified organisms J02.8    Going to continue to complete amoxicillin                    [1]   Current Outpatient Medications on File Prior to Visit   Medication Sig Dispense Refill    latanoprost (Xalatan) 0.005 % ophthalmic solution Administer 1 drop into both eyes once daily at bedtime.      timolol (Timoptic-XR) 0.5 % ophthalmic gel-forming 1 drop once daily.       No current facility-administered medications on file prior to visit.